# Patient Record
Sex: FEMALE | Race: WHITE
[De-identification: names, ages, dates, MRNs, and addresses within clinical notes are randomized per-mention and may not be internally consistent; named-entity substitution may affect disease eponyms.]

---

## 2018-03-01 ENCOUNTER — HOSPITAL ENCOUNTER (OUTPATIENT)
Dept: HOSPITAL 76 - DI | Age: 73
Discharge: HOME | End: 2018-03-01
Attending: INTERNAL MEDICINE
Payer: COMMERCIAL

## 2018-03-01 DIAGNOSIS — Z12.31: Primary | ICD-10-CM

## 2018-03-01 PROCEDURE — 77067 SCR MAMMO BI INCL CAD: CPT

## 2018-03-02 NOTE — MAMMOGRAPHY REPORT
DIGITAL SCREENING MAMMOGRAM:  03/01/2018

 

CLINICAL INDICATION:  A 73-year-old, for screening.

 

COMPARISON:  03/2016, 02/2015, 02/2014, 02/2013, 02/2012, 02/2010.

 

TECHNIQUE:  Routine CC and MLO projections were obtained of the breasts.

 

FINDINGS:  The breasts demonstrate scattered fibroglandular densities bilaterally.  A few 

coarse, typically benign calcifications are present. No suspicious masses, clustered 

microcalcifications, or regions of architectural distortion are identified.

 

IMPRESSION:  BENIGN FINDINGS.

 

RECOMMENDATION:  ROUTINE ANNUAL SCREENING UNLESS OTHERWISE CLINICALLY INDICATED.

BIRADS CATEGORY 2-BENIGN FINDINGS.

STANDARD QUALIFYING STATEMENTS:

1.  This examination was reviewed with the aid of Computer-Aided Detection (CAD).

2.  A negative or benign imaging report should not delay biopsy if clinically suspicious 

findings are present.  Consider surgical consultation if warranted.  More than 5% of cancers 

are not identified by imaging.

3.  Dense breasts may obscure an underlying neoplasm.

 

 

 

DD: 03/02/2018 12:29

TD: 03/02/2018 12:54

Job #: [id number]

## 2019-06-06 ENCOUNTER — HOSPITAL ENCOUNTER (OUTPATIENT)
Dept: HOSPITAL 76 - DI | Age: 74
Discharge: HOME | End: 2019-06-06
Attending: INTERNAL MEDICINE
Payer: COMMERCIAL

## 2019-06-06 DIAGNOSIS — Z78.0: ICD-10-CM

## 2019-06-06 DIAGNOSIS — M81.0: ICD-10-CM

## 2019-06-06 DIAGNOSIS — Z13.820: Primary | ICD-10-CM

## 2019-06-06 PROCEDURE — 77080 DXA BONE DENSITY AXIAL: CPT

## 2019-06-07 NOTE — DEXA REPORT
Reason:  ASYMPTOMATIC MENOPAUSAL STATE

Procedure Date:  06/06/2019   

Accession Number:  [id number] / [id number]                    

Procedure:  DEX - Dexa Spine and/or Hip CPT Code:  

 

FULL RESULT:

 

 

EXAM: Dexa Spine and/or Hip

 

DATE: 6/6/2019 9:56 AM

 

CLINICAL HISTORY: ASYMPTOMATIC MENOPAUSAL STATE

 

TECHNIQUE: Dual energy x-ray absorptiometry (DXA) was performed on a [employer] System.  Regions measured are the AP Spine, femoral neck, and if 

needed forearm.

 

COMPARISON: None.

 

In accordance with the International Society for Clinical Densitometry 

(ISCD) guidelines, data from previous exams may be reanalyzed using 

current recommendations and techniques.  This is done to allow a more 

accurate basis for comparison with the current study.

 

FINDINGS: The data for the lumbar spine is as follows:

 

                 BMD (g/cm/cm)         T-SCORE          Z-SCORE

 REGION

 L1                   0.765                   -3.0                   -1.7

 L2                   0.791                   -3.4                   -2.1

 L3                   1.039                   -1.3                   0.0

 L4                   0.958                   -2.0                   -0.7

TOTAL              0.897                   -2.4                 -1.0

 

NOTE: All evaluable vertebrae are used for classification

 

The data for the hip is as follows:

 

                 BMD (g/cm/cm)         T-SCORE          Z-SCORE

 REGION

 Neck             0.720                       -2.3              -0.7

TOTAL            0.634                       -3.0            -1.6

 

NOTE: The femoral neck or total proximal femur, whichever is lowest, is 

used for classification.

 

IMPRESSION: THE WHO CLASSIFICATION BASED ON THE INTERNATIONAL REFERENCE 

STANDARD IS OSTEOPOROSIS.  THE FRACTURE RISK IS HIGH.

 

RECOMMENDATION: Patients with diagnosis of osteoporosis or osteopenia 

should have regular bone mineral density assessment.  For those eligible 

for Medicare, routine testing is allowed once every 2 years.  Testing 

frequency can be increased for patients who have rapidly progressing 

disease or for those who are receiving medical therapy to restore bone 

mass.

 

COMMENT:  World Health Organization (WHO) definitions for osteoporosis 

and osteopenia:

NORMAL BMD:  T-score at -1.0 or higher, fracture risk is low

OSTEOPENIA BMD:  T-score between -1.0 and -2.5, fracture risk is 

increased.

OSTEOPOROSIS BMD:  T-score at -2.5 or lower, fracture risk is high.

 

National Osteoporosis Foundation recommends:

1. Obtain adequate dietary calcium (at least 1200 mg per day) and vitamin 

D (400-800 international units per day).

2. Participate, as appropriate, in regular weightbearing and 

muscle-strengthening exercise.

3. Avoid tobacco use and reduce alcohol and caffeine intake.

4. For more detailed information see the website at www.NOF.org.